# Patient Record
Sex: FEMALE | Race: WHITE | URBAN - METROPOLITAN AREA
[De-identification: names, ages, dates, MRNs, and addresses within clinical notes are randomized per-mention and may not be internally consistent; named-entity substitution may affect disease eponyms.]

---

## 2020-01-01 ENCOUNTER — PROCEDURE (OUTPATIENT)
Dept: URBAN - METROPOLITAN AREA CLINIC 41 | Facility: CLINIC | Age: 85
End: 2020-01-01
Payer: MEDICARE

## 2020-01-01 PROCEDURE — 67028 INJECTION EYE DRUG: CPT | Performed by: OPHTHALMOLOGY

## 2020-01-01 ASSESSMENT — INTRAOCULAR PRESSURE
OD: 13
OS: 21
OD: 17
OS: 13

## 2020-10-21 ENCOUNTER — OFFICE VISIT (OUTPATIENT)
Dept: URBAN - METROPOLITAN AREA CLINIC 41 | Facility: CLINIC | Age: 85
End: 2020-10-21
Payer: MEDICARE

## 2020-10-21 DIAGNOSIS — Z96.1 PRESENCE OF INTRAOCULAR LENS: ICD-10-CM

## 2020-10-21 DIAGNOSIS — H43.813 VITREOUS DEGENERATION, BILATERAL: ICD-10-CM

## 2020-10-21 PROCEDURE — 92134 CPTRZ OPH DX IMG PST SGM RTA: CPT | Performed by: OPHTHALMOLOGY

## 2020-10-21 PROCEDURE — 67028 INJECTION EYE DRUG: CPT | Performed by: OPHTHALMOLOGY

## 2020-10-21 PROCEDURE — 92014 COMPRE OPH EXAM EST PT 1/>: CPT | Performed by: OPHTHALMOLOGY

## 2020-10-21 ASSESSMENT — INTRAOCULAR PRESSURE
OS: 17
OD: 13

## 2020-10-21 NOTE — IMPRESSION/PLAN
Impression: Macular degeneration, wet OD-Status: Symptomatic.
-s/p Avastin  last 04/17/19
-s/p Lucentis last 09/23/20
-h/o recurrence at 7 wks. Plan: Exam and OCT continue to reveal improved IRF with treatment of Lucentis at 4 weeks. Discussed findings with patient. Recommend injection of Lucentis today and taper next visit to 4 weeks. R/B/A's discussed. Patient elects to proceed. Lucentis performed today without complication.  

RTC: 4 wks straight Lucentis 0.5 OD #2/3

## 2020-10-21 NOTE — IMPRESSION/PLAN
Impression: PVD, OU. Status: Symptomatic. Plan: Patient notes floaters. Exam demonstrates a PVD without any RD or RT on exam.  The floaters are not debilitating to the patient and do not warrant surgery. Reassurance provided. Precautions discussed with the patient.

## 2020-10-21 NOTE — IMPRESSION/PLAN
Impression: PCIOL, OU. Status: Stable. Plan: Stable. Recommend evaluation with low vision.   Discussed that patient qualifies for legal blindness

## 2020-10-21 NOTE — IMPRESSION/PLAN
Impression: Nexdtve age-rel mclr degn, l eye, adv atrpc with sbfvl invl: [id number]. Plan: Patient notes mild distortion in vision. Exam and OCT demonstrate PED/atrophy with stable drusen and RPE changes confirming AMD is still dry. The patient was advised to continue AREDS 2 vitamin supplements; the risk of smoking was emphasized with the patient. The patient was also advised to continue to use an [address] to monitor the vision and to call immediately with any changes. Will closely monitor.

## 2021-01-01 ENCOUNTER — PROCEDURE (OUTPATIENT)
Dept: URBAN - METROPOLITAN AREA CLINIC 41 | Facility: CLINIC | Age: 86
End: 2021-01-01
Payer: MEDICARE

## 2021-01-01 DIAGNOSIS — H35.3211 EXUDATIVE AGE-RELATED MACULAR DEGENERATION, RIGHT EYE, WITH ACTIVE CHOROIDAL NEOVASCULARIZATION: Primary | ICD-10-CM

## 2021-01-01 DIAGNOSIS — H35.3124 NEXDTVE AGE-REL MCLR DEGN, L EYE, ADV ATRPC WITH SBFVL INVL: ICD-10-CM

## 2021-01-01 PROCEDURE — 92134 CPTRZ OPH DX IMG PST SGM RTA: CPT | Performed by: OPHTHALMOLOGY

## 2021-01-01 PROCEDURE — 92012 INTRM OPH EXAM EST PATIENT: CPT | Performed by: OPHTHALMOLOGY

## 2021-01-01 PROCEDURE — 67028 INJECTION EYE DRUG: CPT | Performed by: OPHTHALMOLOGY

## 2021-01-01 PROCEDURE — 92014 COMPRE OPH EXAM EST PT 1/>: CPT | Performed by: OPHTHALMOLOGY

## 2021-01-01 ASSESSMENT — INTRAOCULAR PRESSURE
OS: 14
OS: 14
OD: 14
OD: 22
OD: 16
OD: 19
OS: 16
OD: 10
OD: 17
OS: 26
OS: 16
OD: 14
OS: 16
OS: 21
OS: 10
OD: 21

## 2021-01-27 NOTE — IMPRESSION/PLAN
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 Plan: Patient notes mild distortion in vision. Exam and OCT demonstrate PED/atrophy with stable drusen and RPE changes confirming AMD is still dry. The patient was advised to continue AREDS 2 vitamin supplements; the risk of smoking was emphasized with the patient. The patient was also advised to continue to use an [address] to monitor the vision and to call immediately with any changes. Will closely monitor given suspicious findings on OCT.

## 2021-01-27 NOTE — IMPRESSION/PLAN
Impression: Macular degeneration, wet OD-Status: Symptomatic.
--h/o recurrence at 7 weeks -s/p Avastin  last 04/17/19
-s/p Lucentis last 12/30/2020 Plan: Exam and OCT continue to reveal improved IRF with treatment of Lucentis at 4 weeks. Discussed findings with patient. Recommend injection of Lucentis today and continue with 4 week treatments. R/B/A's discussed. Patient elects to proceed. Lucentis performed today without complication.  

RTC: 4 wks straight Lucentis 0.5 OD #1/3 with OCT

## 2021-06-02 NOTE — IMPRESSION/PLAN
Impression: Macular degeneration, wet OD-Status: Symptomatic.
--h/o recurrence at 7 weeks -s/p Avastin  last 04/17/19
-s/p Lucentis last 04/21/2021 Plan: Exam and OCT continue to reveal improved IRF with treatment of Lucentis at 6 weeks. Discussed findings with patient. Recommend injection of Lucentis today and continue with 6 week treatments. R/B/A's discussed. Patient elects to proceed. Lucentis performed today without complication.  

RTC: 6 wks OCT OU; STRAIGHT LUCENTIS 0.5 OD #1/3

## 2021-06-02 NOTE — IMPRESSION/PLAN
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 Plan: Patient notes mild distortion in vision. Exam and OCT demonstrate PED/atrophy with stable drusen and RPE changes confirming AMD is still dry. The patient was advised to continue AREDS 2 vitamin supplements; the risk of smoking was emphasized with the patient. The patient was also advised to continue to use an [address] to monitor the vision and to call immediately with any changes. Will closely monitor given suspicious findings on OCT.

## 2023-02-10 NOTE — ASSESSMENT/PLAN
Impression:  Plan: OD: drusenoid PED w/ IRF, no SRF
OS: drusen w/ RPE changes and atrophy 10-Feb-2023 09:22